# Patient Record
Sex: MALE | Race: BLACK OR AFRICAN AMERICAN
[De-identification: names, ages, dates, MRNs, and addresses within clinical notes are randomized per-mention and may not be internally consistent; named-entity substitution may affect disease eponyms.]

---

## 2023-02-06 ENCOUNTER — HOSPITAL ENCOUNTER (EMERGENCY)
Dept: HOSPITAL 97 - ER | Age: 38
Discharge: HOME | End: 2023-02-06
Payer: OTHER GOVERNMENT

## 2023-02-06 VITALS — SYSTOLIC BLOOD PRESSURE: 113 MMHG | OXYGEN SATURATION: 98 % | TEMPERATURE: 97.8 F | DIASTOLIC BLOOD PRESSURE: 90 MMHG

## 2023-02-06 DIAGNOSIS — J18.9: ICD-10-CM

## 2023-02-06 DIAGNOSIS — M62.838: Primary | ICD-10-CM

## 2023-02-06 LAB
ALBUMIN SERPL BCP-MCNC: 3.3 G/DL (ref 3.4–5)
ALP SERPL-CCNC: 111 U/L (ref 45–117)
ALT SERPL W P-5'-P-CCNC: 53 U/L (ref 16–61)
AST SERPL W P-5'-P-CCNC: 41 U/L (ref 15–37)
BUN BLD-MCNC: 15 MG/DL (ref 7–18)
GLUCOSE SERPLBLD-MCNC: 134 MG/DL (ref 74–106)
HCT VFR BLD CALC: 45.9 % (ref 39.6–49)
LIPASE SERPL-CCNC: 97 U/L (ref 73–393)
LYMPHOCYTES # SPEC AUTO: 1.4 K/UL (ref 0.7–4.9)
MCV RBC: 89.5 FL (ref 80–100)
PMV BLD: 9.7 FL (ref 7.6–11.3)
POTASSIUM SERPL-SCNC: 3.8 MMOL/L (ref 3.5–5.1)
RBC # BLD: 5.13 M/UL (ref 4.33–5.43)

## 2023-02-06 PROCEDURE — 83690 ASSAY OF LIPASE: CPT

## 2023-02-06 PROCEDURE — 83605 ASSAY OF LACTIC ACID: CPT

## 2023-02-06 PROCEDURE — 36415 COLL VENOUS BLD VENIPUNCTURE: CPT

## 2023-02-06 PROCEDURE — 85379 FIBRIN DEGRADATION QUANT: CPT

## 2023-02-06 PROCEDURE — 71045 X-RAY EXAM CHEST 1 VIEW: CPT

## 2023-02-06 PROCEDURE — 87040 BLOOD CULTURE FOR BACTERIA: CPT

## 2023-02-06 PROCEDURE — 85025 COMPLETE CBC W/AUTO DIFF WBC: CPT

## 2023-02-06 PROCEDURE — 80053 COMPREHEN METABOLIC PANEL: CPT

## 2023-02-06 NOTE — EDPHYS
Physician Documentation                                                                           

 [facility]                                                                 

Name: Dean Reyez Jr                                                                             

Age: 37 yrs                                                                                       

Sex: Male                                                                                         

: 1985                                                                                   

MRN: [id number]                                                                                   

Arrival Date: 2023                                                                          

Time: 15:41                                                                                       

Account#: [id number]                                                                            

Bed 15                                                                                            

Private MD:                                                                                       

ED Physician Edi Gibbs                                                                         

HPI:                                                                                              

                                                                                             

15:47 This 37 yrs old Black Male presents to ER via Unassigned with complaints of Abdominal   kb  

      Pain.                                                                                       

15:47 The patient has not experienced similar symptoms in the past. The patient has been      kb  

      recently seen by a physician:. Patient reports he recently had COVID-pneumonia and was      

      hospitalized at [facility], discharged on Saturday. States he has had a cough for        

      quite some time but today whenever he coughed he had cramping to his abdomen. Denies        

      any pain at rest. States the pain only comes with cough but not every time he coughs..      

                                                                                                  

Historical:                                                                                       

- Allergies:                                                                                      

18:36 No Known Allergies;                                                                     jh5 

                                                                                                  

- Immunization history:: Adult Immunizations up to date.                                          

- Social history:: Smoking status: Patient denies any tobacco usage or history of.                

                                                                                                  

                                                                                                  

ROS:                                                                                              

15:47 Constitutional: Negative for fever, chills, and weight loss.                            kb  

15:47 Abdomen/GI: Positive for abdominal pain, Negative for nausea, vomiting, and diarrhea.       

15:47 All other systems are negative.                                                             

                                                                                                  

Exam:                                                                                             

15:47 Constitutional:  This is a well developed, well nourished patient who is awake, alert,  kb  

      and in no acute distress. Head/Face:  Normocephalic, atraumatic. ENT:  Moist Mucous         

      membranes Cardiovascular:  Regular rate and rhythm with a normal S1 and S2.  No             

      gallops, murmurs, or rubs.  No pulse deficits. Respiratory:  Respirations even and          

      unlabored. No increased work of breathing. Talking in full sentences Abdomen/GI:  Soft,     

      non-tender. No distention.  Skin:  Warm, dry with normal turgor.  Normal color. MS/         

      Extremity:  Pulses equal, no cyanosis.  Neurovascular intact.  Full, normal range of        

      motion. Neuro:  Awake and alert, GCS 15, oriented to person, place, time, and               

      situation. Moves all extremities. Normal gait. Psych:  Awake, alert, with orientation       

      to person, place and time.  Behavior, mood, and affect are within normal limits.            

                                                                                                  

Vital Signs:                                                                                      

16:31  / 90; Pulse 78; Resp 26; Temp 97.8; Pulse Ox 98% on R/A; Weight 129.27 kg;       ph  

      Height 5 ft. 11 in. (180.34 cm);                                                            

16:31 Body Mass Index 39.75 (129.27 kg, 180.34 cm)                                            ph  

                                                                                                  

MDM:                                                                                              

15:45 Patient medically screened.                                                             kb  

15:47 Data reviewed: vital signs, nurses notes.                                               kb  

18:37 Differential diagnosis: non-specific abd pain, pneumonia, muscle spasm. Test considered kb  

      but Not performed: CT: CT abd/pelvis considered, but pt has no abd tenderness. Only         

      reports spasms. CT PE considered, but d-dimer wnl. Historians other than the Patient:       

      EMS: [facility]. Counseling: I had a detailed discussion with the patient and/or guardian       

      regarding: the historical points, exam findings, and any diagnostic results supporting      

      the discharge/admit diagnosis, lab results, radiology results, the need for outpatient      

      follow up, a family practitioner, to return to the emergency department if symptoms         

      worsen or persist or if there are any questions or concerns that arise at home.             

18:39 ED course: Pt was prescribed antibiotics upon discharge from [facility] on Saturday. Pt       kb  

      states he is taking them, but cannot remember the name of it. .                             

                                                                                                  

02                                                                                             

15:46 Order name: CBC with Diff; Complete Time: 17:48                                         kb  

                                                                                             

15:46 Order name: CMP; Complete Time: 17:48                                                   kb  

                                                                                             

15:46 Order name: Lipase; Complete Time: 17:48                                                kb  

                                                                                             

16:19 Order name: Blood Culture Adult (2)                                                     kb  

                                                                                             

16:19 Order name: Lactate w/ 2H reflex if indic.; Complete Time: 17:48                        kb  

                                                                                             

16:19 Order name: D-Dimer; Complete Time: 17:48                                               kb  

                                                                                             

15:46 Order name: IV Saline Lock; Complete Time: 17:18                                        kb  

                                                                                             

15:46 Order name: Labs collected and sent; Complete Time: 17:18                               kb  

                                                                                             

15:46 Order name: Chest Single View XRAY; Complete Time: 16:19                                kb  

                                                                                                  

Administered Medications:                                                                         

17:31 Drug: NS 0.9% 1000 ml Route: IV; Rate: 1 bolus; Site: right antecubital;                jh5 

17:31 Drug: TORadol - (ketorolac) 15 mg Route: IVP; Site: right antecubital;                  [facility] 

17:31 Drug: Zofran (Ondansetron) 4 mg Route: IVP; Site: right antecubital;                    [facility] 

                                                                                                  

                                                                                                  

Disposition Summary:                                                                              

23 18:38                                                                                    

Discharge Ordered                                                                                 

      Location: Home                                                                          kb  

      Condition: Stable                                                                       kb  

      Diagnosis                                                                                   

        - Muscle spasm                                                                        kb  

        - Pneumonia, unspecified organism                                                     kb  

      Followup:                                                                               kb  

        - With: Emergency Department                                                               

        - When: As needed                                                                          

        - Reason: Worsening of condition                                                           

      Followup:                                                                               kb  

        - With: Private Physician                                                                  

        - When: 2 - 3 days                                                                         

        - Reason: Recheck today's complaints, Continuance of care, Re-evaluation by your           

      physician                                                                                   

      Discharge Instructions:                                                                     

        - Discharge Summary Sheet                                                             kb  

        - Muscle Cramps and Spasms, Easy-to-Read                                              kb  

      Forms:                                                                                      

        - Medication Reconciliation Form                                                      kb  

        - Thank You Letter                                                                    kb  

        - Antibiotic Education                                                                kb  

        - Prescription Opioid Use                                                             kb  

      Prescriptions:                                                                              

        - orphenadrine citrate 100 mg Oral Tablet Sustained Release                                

            - take 1 tablet by ORAL route 2 times per day As needed; 20 tablet; Refills: 0,   kb  

      Product Selection Permitted                                                                 

Signatures:                                                                                       

Dispatcher [employer]                           Janine Quevedo FNP-C FNP-Ckb Rees, Jessica, RN                       RN   [facility]                                                  

                                                                                                  

**************************************************************************************************

## 2023-02-06 NOTE — XMS REPORT
Continuity of Care Document

                           Created on:2023



Patient:HEATHER METZ

Sex:Male

:1985

External Reference #:[id number]





Demographics







                          Address                   [address]



                                                    [place] [address]

 

                          Home Phone                [phone]

 

                          Work Phone                [phone]

 

                          Mobile Phone              [phone]

 

                          Email Address             NONE

 

                          Preferred Language        English

 

                          Marital Status            Unknown

 

                          Religion Affiliation     Unknown

 

                          Race                      Unknown

 

                          Additional Race(s)        Unavailable

 

                          Ethnic Group              Unknown









Author







                          Organization              [facility]

t

 

                          Address                   [address]



                                                    [place] [address]

 

                          Phone                     [phone]









Support







                Name            Relationship    Address         Phone

 

                BRETT METZ               Unavailable     [phone]









Care Team Providers







                    Name                Role                Phone

 

                    PCP, PATIENT DOES NOT HAVE A Primary Care Physician Ayse Soriano RN     Attending Clinician Unavailable

 

                    CAROLINA TORRES      Attending Clinician Unavailable

 

                    Brian RAZA, Carolina   Attending Clinician [phone]

 

                    CAROLINA TORRES      Admitting Clinician Unavailable

 

                    Carolina Torres MD   Admitting Clinician [phone]









Payers







           Payer Name Policy Type Policy Number Effective Date Expiration Date S

ource







Problems







       Condition Condition Condition Status Onset  Resolution Last   Treating Co

mments 

Source



       Name   Details Category        Date   Date   Treatment Clinician        



                                                 Date                 

 

       Obesity Obesity Disease Active                              [facility]



       (BMI   (BMI                 2-02                               ity of



       30-39.9) 30-39.9)               00:00:                             [place]



                                   00                                 Medical



                                                                      Branch

 

       Pneumonia Pneumonia Disease Active                              Uni

vers



       due to due to               2-02                               ity of



       Streptococ Streptococ               00:00:                             [place]



       cus    cus                                                   Medical



                                                                      Branch







Allergies, Adverse Reactions, Alerts







       Allergy Allergy Status Severity Reaction(s) Onset  Inactive Treating Comm

ents 

Source



       Name   Type                        Date   Date   Clinician        

 

       NO KNOWN Drug   Active                                           [facility]



       ALLERGIE Class                                                   ity of



       S                                                              [place]



                                                                      Medical



                                                                      Branch







Social History







           Social Habit Start Date Stop Date  Quantity   Comments   Source

 

           History SDOH Social                                             Unive

rsity of



           Connections Get                                             [place] [facility]

ical



           Together                                               Branch

 

           History SDOH Social                                             Unive

rsity of



           [facility]



                                                                  Branch

 

           History SDOH Social                                             Unive

rsity of



           [facility]



           Membership                                             Branch

 

           History SDOH Social                                             Unive

rsity of



           [facility]



           Meetings                                               Branch

 

           History of tobacco                       Passive smoker            Un

iversity of



           use                                                    [facility]



                                                                  Branch

 

           History SDOH 2023 0                     University o

f



           Alcohol Std Drinks 00:00:00   00:00:00                         [place] 

[facility]



                                                                  Branch

 

           History SDOH 2023 1                     University o

f



           Alcohol Binge 00:00:00   00:00:00                         [place] [facility]

al



                                                                  Branch

 

           History SDOH Social 2023 5                     Unive

rsity of



           Connections Phone 00:00:00   00:00:00                         [facility]

edical



                                                                  Branch

 

           History SDOH Social 2023 7                     Unive

rsity of



           Connections Living 00:00:00   00:00:00                         [place] 

[facility]



                                                                  Branch

 

           History SDOH 2023 5                     University o

f



           Physical Activity 00:00:00   00:00:00                         [facility]

edical



           DPW                                                    Branch

 

           History SDOH 2023 4                     University o

f



           Physical Activity 00:00:00   00:00:00                         [facility]

edical



           MPS                                                    Branch

 

           History SDOH 2023 5                     University o

f



           Financial  00:00:00   00:00:00                         [place] [facility]



                                                                  Branch

 

           History SDOH Food 2023 1                     Univers

ity of



           Worry      00:00:00   00:00:00                         [place] [facility]



                                                                  Branch

 

           History SDOH Food 2023 1                     Univers

ity of



           Scarcity   00:00:00   00:00:00                         [place] [facility]



                                                                  Branch

 

           History SDOH 2023 2                     University o

f



           Transport Med 00:00:00   00:00:00                         [place] [facility]

al



                                                                  Branch

 

           History SDOH 2023 2                     University o

f



           Transport Non-Med 00:00:00   00:00:00                         [facility]

edical



                                                                  Branch

 

           Alcohol intake 2023 Ex-drinker            University

 of



                      00:00:00   00:00:00   (finding)             [place] [facility]



                                                                  Branch

 

           History SDOH 2023 1                     University o

f



           Alcohol Frequency 00:00:00   00:00:00                         [facility]

edical



                                                                  Branch

 

           Exposure to 2023 Not sure              University of



           SARS-CoV-2 (event) 00:00:00   23:33:00                         [facility]

 

           Tobacco use and 2023 Smokeless             Universit

y of



           exposure   00:00:00   00:00:00   tobacco non-user            [facility]

 

           Sex Assigned At 1985                       Universit

y of



           Birth      00:00:00   00:00:00                         [facility]









                Smoking Status  Start Date      Stop Date       Source

 

                Ex-smoker       2023 00:00:00 2023 00:00:00 [facility]







Medications







       Ordered Filled Start  Stop   Current Ordering Indication Dosage Frequency

 Signature

                    Comments            Components          Source



     Medication Medication Date Date Medication? Clinician                (SIG) 

          



     Name Name                                                   

 

     amLODIPine      2023- Yes       [id number] 10mg      Take 1           

Univers



     10 mg      -08                          tablet by           ity of



     tablet      00:00: 05:59                          mouth in           [place]



               00   :00                           [facility]



                                                  for 30           



                                                  days.           

 

     losartan 50      2023- Yes       [id number] 50mg      Take 1          

 Univers



     mg tablet      -08                          tablet by           ity 

of



               00:00: 05:59                          mouth in           [place]



               00   :00                           [facility]



                                                  for 30           



                                                  days.           

 

     amLODIPine      2023- Yes       [id number] 10mg      Take 1           

Univers



     10 mg      -08                          tablet by           ity of



     tablet      00:00: 05:59                          mouth in           [place]



               00   :00                           [facility]



                                                  for 30           



                                                  days.           

 

     losartan 50      2023- Yes       [id number] 50mg      Take 1          

 Univers



     mg tablet      -08                          tablet by           ity 

of



               00:00: 05:59                          mouth in           [place]



               00   :00                           [facility]



                                                  for 30           



                                                  days.           

 

     amLODIPine      2023- Yes       [id number] 10mg      Take 1           

Univers



     10 mg      -08                          tablet by           ity of



     tablet      00:00: 05:59                          mouth in           [place]



               00   :00                           [facility]



                                                  for 30           



                                                  days.           

 

     losartan 50      2023- Yes       [id number] 50mg      Take 1          

 Univers



     mg tablet      -08                          tablet by           ity 

of



               00:00: 05:59                          mouth in           [place]



               00   :00                           [facility]



                                                  for 30           



                                                  days.           

 

     remdesivir      2023- Yes            100mg      100 mg, IV          

 Univers



     100 mg in      08                          Infusion,           ity 

of



     NaCl 0.9%      22:00: 21:59                          DAILY AT           [facility]

as



     (NS) 100 mL      00   :00                           1600, 4           Medic

al



     MINI-BAG                                         doses,           Branch



                                                  First dose           



                                                  on Sat           



                                                  23 at           



                                                  1600, Last           



                                                  dose on           



                                                  23           



                                                  at 1600,           



                                                  Administer           



                                                  over 60           



                                                  Minutes,           



                                                  100            



                                                  mL<br>Is           



                                                  the            



                                                  patient           



                                                  mechanical           



                                                  ly             



                                                  ventilated           



                                                  ? NO           

 

     amLODIPine            Yes            10mg      10 mg,           Unive

rs



     (NORVASC)      2-04                               Oral,           ity of



     tablet 10      15:00:                               DAILY,           [place]



     mg        00                                 First dose           Medical



                                                  (after           Branch



                                                  last           



                                                  modificati           



                                                  on) on Sat           



                                                  23 at           



                                                  0900,           



                                                  Until           



                                                  Discontinu           



                                                  ed,            



                                                  Routine           

 

     losartan            Yes            50mg      50 mg,           Univers



     (COZAAR)      2-04                               Oral,           ity of



     tablet 50      14:30:                               DAILY,           [place]



     mg        00                                 First dose           Medical



                                                  on [facility]



                                                  23 at           



                                                  0830,           



                                                  Until           



                                                  Discontinu           



                                                  ed,            



                                                  Routine           

 

     acetaminoph            Yes            650mg      650 mg,           Un

fitz



     en        2-04                               Oral,           ity of



     (TYLENOL)      14:28:                               Q6HPRN,           [place]



     tablet 650      54                                 Starting           Medic

al



     mg                                           on [facility]



                                                  23 at           



                                                  0828,           



                                                  Until           



                                                  Discontinu           



                                                  ed,            



                                                  Routine,           



                                                  Pain           



                                                  (scale           



                                                  1-3)           

 

     ALBUTEROL            Yes                      Inhale as           Uni

vers



     INHALE      2-04                               needed.           ity of



               14:20:                                              [address]

 

     ALBUTEROL            Yes                      Inhale as           Uni

vers



     INHALE      2-04                               needed.           ity of



               14:20:                                              [address]

 

     ALBUTEROL      0      Yes                      Inhale as           Uni

vers



     INHALE      2-04                               needed.           ity of



               14:20:                                              [address]

 

     lactobacill      2023- Yes       [id number] .5mg      Take 1          

 Univers



     us        2- 02-15                          tablet by           ity of



     acidophilus      00:00: 05:59                          mouth in           [place]

[place]



               00   :00                           the            Medical



                                                  morning           Branch



                                                  and 1           



                                                  tablet in           



                                                  the            



                                                  evening.           



                                                  Do all           



                                                  this for           



                                                  10 days.           

 

     lactobacill      0 - Yes       [id number] .5mg      Take 1          

 Univers



     us        2- 02-15                          tablet by           ity of



     acidophilus      00:00: 05:59                          mouth in           [place]

[place]



               00   :00                           the            Medical



                                                  morning           Branch



                                                  and 1           



                                                  tablet in           



                                                  the            



                                                  evening.           



                                                  Do all           



                                                  this for           



                                                  10 days.           

 

     lactobacill      2023- Yes       [id number] .5mg      Take 1          

 Univers



     us        2- 02-15                          tablet by           ity of



     acidophilus      00:00: 05:59                          mouth in           [place]

[place]



               00   :00                           the            Medical



                                                  morning           Branch



                                                  and 1           



                                                  tablet in           



                                                  the            



                                                  evening.           



                                                  Do all           



                                                  this for           



                                                  10 days.           

 

     levoFLOXaci      3-0 2023- Yes       [id number] 750mg      Take 1         

  Univers



     n         2- 02-10                          tablet by           ity of



     (LEVAQUIN)      00:00: 05:59                          mouth           [place]



     750 mg      00   :00                           every 24           Medical



     tablet                                         [facility]           [facility]



                                                  ur) hours           



                                                  for 5           



                                                  days.           

 

     levoFLOXaci      2023-0 2023- Yes       [id number] 750mg      Take 1         

  Univers



     n         2- 02-10                          tablet by           ity of



     (LEVAQUIN)      00:00: 05:59                          mouth           [place]



     750 mg      00   :00                           every 24           Medical



     tablet                                         [facility]           Branch



                                                  ur) hours           



                                                  for 5           



                                                  days.           

 

     levoFLOXaci      -0 - Yes       [id number] 750mg      Take 1         

  Univers



     n         2-10                          tablet by           ity of



     (LEVAQUIN)      00:00: 05:59                          mouth           [place]



     750 mg      00   :00                           every 24           Medical



     tablet                                         ([facility]           Branch



                                                  ur) hours           



                                                  for 5           



                                                  days.           

 

     amLODIPine      -0 - No             5mg       5 mg,           Unive

rs



     (NORVASC)      -                          Oral,           ity of



     tablet 5 mg      23:00: 22:38                          ONCE, 1           [facility]

[place]



               00   :00                           dose, On           Medical



                                                  Fri 2/3/23           Branch



                                                  at 1700,           



                                                  Routine           

 

     hydralAZINE            Yes            10mg      10 mg,           Univ

ers



     (APRESOLINE      2-03                               Slow IV           ity o

f



     ) injection      22:06:                               Push,           [place]



     10 mg      32                                 Q6HPRN,           Medical



                                                  Starting           Branch



                                                  on Fri           



                                                  2/3/23 at           



                                                  1606,           



                                                  Until           



                                                  Discontinu           



                                                  ed, STAT,           



                                                  DBP=>100;           



                                                  SBP=>160           

 

     amLODIPine      -0 - No             5mg       5 mg,           Unive

rs



     (NORVASC)      -03                          Oral,           ity of



     tablet 5 mg      16:45: 22:05                          DAILY,           [facility]

as



               00   :41                           First dose           Medical



                                                  on Fri           Branch



                                                  2/3/23 at           



                                                  1045,           



                                                  Until           



                                                  Discontinu           



                                                  ed,            



                                                  Routine           

 

     codeine-gua      -0      Yes            10mL      10 mL,           Univ

ers



     ifenesin      2-03                               Oral,           ity of



     (ROBITUSSIN      16:38:                               Q6HPRN,           Matt

as



     AC)       03                                 Starting           Medic

al



     mg/5 mL                                         on Fri           Branch



     oral                                         2/3/23 at           



     solution 10                                         1038,           



     mL                                           Until           



                                                  Discontinu           



                                                  ed,            



                                                  Routine,           



                                                  Cough           

 

     piperacilli      2023-0 2023- Yes            3.375g      3.375 g,          

 Univers



     n-tazobacta      -13                          IV             ity of



     m (ZOSYN)      16:00: 15:59                          Piggyback,           T

exas



     3.375 g in      00   :00                           Q8H ABX,           Medic

al



     NaCl 0.9%                                         30 doses,           Branc

h



     (NS) 50 mL                                         First dose           



     MINI-BAG                                         on Fri           



                                                  2/3/23 at           



                                                  1000, Last           



                                                  dose on           



                                                  23 at           



                                                  0200,           



                                                  Administer           



                                                  over 4           



                                                  Hours, 50           



                                                  mL<br>Reas           



                                                  on for           



                                                  Anti-Infec           



                                                  tive:           



                                                  Empiric           



                                                  Therapy           



                                                  for            



                                                  Suspected           



                                                  Infection<           



                                                  br>Empiric           



                                                  Therapy           



                                                  Site:           



                                                  Respirator           



                                                  y<br>Durat           



                                                  ion of           



                                                  therapy: 5           



                                                  days           

 

     lactobacill      -0      Yes            .5mg      0.5 mg,           Uni

vers



     us        2-03                               Oral, BID,           ity of



     acidophilus      14:00:                               First dose           

[place]



     tablet 0.5      00                                 on Fri           Medical



     mg                                           2/3/23 at           Branch



                                                  0800,           



                                                  Until           



                                                  Discontinu           



                                                  ed,            



                                                  Routine           

 

     docusate      -0      Yes            100mg      100 mg,           Unive

rs



     (COLACE)      2-03                               Oral, BID,           ity o

f



     capsule 100      14:00:                               First dose           

[place]



     mg        00                                 on Fri           Medical



                                                  2/3/23 at           Branch



                                                  0800,           



                                                  Until           



                                                  Discontinu           



                                                  ed,            



                                                  Routine           

 

     piperacilli      2023-0 2023- No             4.5g      4.5 g, IV           

[facility]



     n-tazobacta      -                          Piggyback,           i

ty of



     m (ZOSYN)      08:15: 10:37                          ONCE, 1           [place]

s



     4.5 g in      00   :15                           dose, On           Medical



     NaCl 0.9%                                         Fri 2/3/23           Bran

ch



     (NS) 50 mL                                         at 0215,           



     MINI-BAG                                         Administer           



                                                  over 30           



                                                  Minutes,           



                                                  50             



                                                  mL<br>Reas           



                                                  on for           



                                                  Anti-Infec           



                                                  tive:           



                                                  Empiric           



                                                  Therapy           



                                                  for            



                                                  Suspected           



                                                  Infection<           



                                                  br>Empiric           



                                                  Therapy           



                                                  Site:           



                                                  Respirator           



                                                  y<br>Durat           



                                                  ion of           



                                                  therapy: 5           



                                                  days           

 

     ipratropium      3-0      Yes            3mL       3 mL,           Unive

rs



     -albuteroL      2-03                               Inhalation           ity

 of



     (DUONEB)      07:30:                               , QID,           [place]



     0.5 mg-3      00                                 First dose           Medic

al



     mg(2.5 mg                                         on Fri           Branch



     base)/3 mL                                         2/3/23 at           



     nebulizer                                         0130,           



     solution 3                                         Until           



     mL                                           Discontinu           



                                                  ed,            



                                                  Routine           

 

     NaCl 0.9%      2023- No             1000mL      at 100           Uni

vers



     (NS) IV      203 02-03                          mL/hr, IV           ity of



     infusion      07:30: 17:55                          Infusion,           Matt

as



     1,000 mL      00   :18                           CONTINUOUS           Medic

al



                                                  , Starting           Branch



                                                  on Fri           



                                                  2/3/23 at           



                                                  0130,           



                                                  Until Fri           



                                                  2/3/23 at           



                                                  1155,           



                                                  Routine           

 

     ondansetron            Yes            4mg       4 mg, Slow           

Univers



     (ZOFRAN                                     IV Push,           ity of



     (PF))      05:58:                               Q6HPRN,           [place]



     injection 4      00                                 Starting           Medi

prateek



     mg                                           on Thu           Branch



                                                  23 at           



                                                  2358,           



                                                  Until           



                                                  Discontinu           



                                                  ed,            



                                                  Routine,           



                                                  Nausea and           



                                                  Vomiting           



                                                  (N/V)           

 

     traMADOL      2023- No        [id number] 100mg      Take 2           Un

fitz



     (ULTRAM) 50                                tablets by           i

ty of



     mg tablet      00:00: 00:00                          mouth           [place]



               00   :00                           every 6           Medical



                                                  (six)           Branch



                                                  hours as           



                                                  needed           



                                                  (severe           



                                                  pain,           



                                                  alternate           



                                                  with           



                                                  ibuprofen)           



                                                  .              

 

     ibuprofen      2023- No        [id number] 800mg      Take 1           U

nivers



     800 mg                                tablet by           ity of



     tablet      00:00: 00:00                          mouth           [place]



               00   :00                           every 8           Medical



                                                  (eight)           Branch



                                                  hours as           



                                                  needed for           



                                                  Pain           



                                                  (scale           



                                                  1-3) or           



                                                  Pain           



                                                  (scale           



                                                  4-6).           

 

     ondansetron      2023- No        [id number] 8mg       Take 1           

Univers



     (ZOFRAN                                tablet by           ity of



     ODT) 8 mg      00:00: 00:00                          mouth           [place]



     disintegrat      00   :00                           every 8           Medic

al



     ing tablet                                         (eight)           Branch



                                                  hours as           



                                                  needed for           



                                                  Nausea and           



                                                  Vomiting           



                                                  (N/V).           







Immunizations







           Ordered    Filled Immunization Date       Status     Comments   [facility]

e



           Immunization Name Name                                        

 

           Remdesivir            2023 Completed             [facility]



                                 00:00:00                         [facility]

 

           Remdesivir            2023 Completed             [facility]



                                 00:00:00                         [facility]

 

           Remdesivir            2023 Completed             [facility]



                                 00:00:00                         [facility]







Vital Signs







             Vital Name   Observation Time Observation Value Comments     Source

 

             Heart rate   2023 18:00:00 88 /min                   [facility]

 

             Respiratory rate 2023 18:00:00 17 /min                   [facility]

 

             Oxygen saturation in 2023 18:00:00 95 /min                   

[facility]



             Arterial blood by                                        [place] [facility]

[facility]ate[facility]



             Pulse oximetry                                        Branch

 

             Systolic blood 2023 17:46:00 149 mm[Hg]                Univer

sity of



             pressure                                            [facility]

 

             Diastolic blood 2023 17:46:00 90 mm[Hg]                 Unive

[facility]

 

             Body temperature 2023 17:46:00 36.78 Jolie                 [facility]

 

             Body weight  2023 09:43:00 128.459 kg                [facility]

 

             BMI          2023 09:43:00 39.50 kg/m2               [facility]

 

             Body height  2023 05:37:00 180.3 cm                  [facility]







Procedures







                Procedure       Date / Time     Performing Clinician Source



                                Performed                       

 

                XR CHEST 1 VW   2023 20:51:00 Karolina Unger [facility]

 

                SODIUM, URINE RANDOM 2023 20:36:00 Carolina Torres  [facility]

 

                PROTEIN CREAT RATIO 2023 20:36:00 Carolina Torres  [facility]



                URINE RANDOM                                    [facility]

 

                URINE DRUG (IMMUNOASSAY) 2023 20:36:00 Carolina Torres  [facility]



                SCREEN                                          

 

                URINE CULTURE   2023 20:35:00 Carolina Torres  [facility]

 

                URINALYSIS      2023 20:35:00 Carolina Torres  [facility]

 

                SPUTUM CULTURE  2023 16:26:00 Carolina Torres  [facility]

 

                FREE T3         2023 16:23:00 Carolina Torres  [facility]

 

                BLOOD CULTURE SCREEN 2023 16:23:00 Carolina Torres  [facility]

 

                FREE T4         2023 16:23:00 Brian [facility]

 

                PNEUMOCOCCAL ANTIGEN 2023 09:09:00 Brian handy  [facility]

 

                N-TERMINAL PRO-BNP 2023 08:58:00 Brian [facility]

 

                PROCALCITONIN   2023 08:58:00 Brian [facility]

 

                AC VBG + LACTIC ACID 2023 08:58:00 Brian handy  [facility]

 

                PHOSPHORUS      2023 08:58:00 Brian [facility]

 

                LACTATE DEHYDROGENASE 2023 08:58:00 [facility]

 

                CREATINE KINASE 2023 08:58:00 Brian [facility]

 

                MAGNESIUM       2023 08:58:00 Brian [facility]

 

                FERRITIN SERUM  2023 08:58:00 Brian [facility]

 

                C-REACTIVE PROTEIN 2023 08:58:00 Brian [facility]

 

                TROPONIN I      2023 08:58:00 Brian [facility]

 

                THYROID STIMULATING 2023 08:58:00 Brian handy  [facility]



                HORMONE                                         [facility]

 

                COMP. METABOLIC PANEL 2023 08:58:00 Brian [facility]



                ([id number])                                         [facility]

 

                LIPID PANEL     2023 08:58:00 Brian [facility]



                ([id number])(TOTAL                                   [facility]



                CHOLESTEROL,                                    



                TRIGLYCERIDES, HDL)                                 

 

                SEDIMENTATION RATE 2023 08:58:00 Brian [facility]

 

                CBC WITH DIFF   2023 08:58:00 Brian [facility]

 

                GLYCOSYLATED HEMOGLOBIN 2023 08:58:00 Brian Adnan  [facility]

[facility] [place]



                (A1C)                                           [facility]







Encounters







        Start   End     Encounter Admission Attending Care    Care    Encounter 

Source



        Date/Time Date/Time Type    Type    Clinicians Facility Department ID   

   

 

        2023 Transition         GRACE Murphy  [id number]

[id number] [facility]



        00:00:00 00:00:00 of Care         Ayse EPPERSON   [id number]         it

y of



                                                FRANCA   [id number]         [place]

[place]



                                                        [phone]         Medi

prateek



                                                        403             Branch

 

        2023 Inpatient U       BRIAN [facility]     [id number]

[id number] Univers



        22:57:00 13:50:00                 ADNAN                           ity [facility]

 

        2023 [facility]         [facility]    [id number]

[id number] Univers



        22:57:00 13:50:00 Encounter         Carolina COOPER [id number]        

 ity of



                                                [facility] [id number]         [facility]  [phone]         Medi

prateek



                                                        081             Branch







Results







           Test Description Test Time  Test Comments Results    Result Comments 

Source









                    SPUTUM CULTURE      2023 06:52:32 









                      Test Item  Value      Reference Range Interpretation Comme

nts









                SPUTUM CULTURE (test code = 622-1) Specimen cellular elements do

 not represent                 

                                        



                          lower respiratory tract. Specimen rejected            

               



                          for routine bacterial culture. Suggest                

           



                          reorder and recollection.                           

 

             Gram stain (test code = 664-3) Numerous Epithelial cells present   

                        



[id number] 18:59:20





             Test Item    Value        Reference Range Interpretation Comments

 

             CRP (test code = 4463621206) 7.9 mg/dL    <=0.8        H           

 

 

             Lab Interpretation (test code = Abnormal                           

    



             48338-1)                                            



[facility]56[facility]26





             Test Item    Value        Reference Range Interpretation Comments

 

             Procalcitonin (test 0.39 ng/mL   <=0.07       H            



             code = 2958866024)                                        

 

             COOKIE (test code = COOKIE) INTERPRETATION OF                           



                          PROCALCITONIN RESULTS IN                           



                          ADULTS >= 18 YEARS OF                           



                          AGE Initiation and                           



                          discontinuation of                           



                          antibiotics on patients                           



                          with suspected or                           



                          confirmed Lower                           



                          Respiratory Tract                           



                          Infection in Adults >=                           



                          18 years of age.                           



                          +--------------+--------                           



                          --------+---------------                           



                          +-----------------------                           



                          -----+|Procalcitonin                           



                          |Interpretation                           



                          ?|Antibiotic ? ?                           



                          |Considerations ? ? ? ?                           



                          ? ? ? |ng/mL ? ? ? ? | ?                           



                          ? ? ? ? ? ?                            



                          ?|recommendation | ? ? ?                           



                          ? ? ? ? ? ? ? ? ? ? ?                           



                          +--------------+--------                           



                          --------+---------------                           



                          +-----------------------                           



                          -----+| <0.1 ? ? ? ? |                           



                          Bacterial ? ? ?|                           



                          Strongly ? ? ?| ? ? ? ?                           



                          ? ? ? ? ? ? ? ? ? ? | ?                           



                          ? ? ? ? ? ?| infection                           



                          very | discouraged ? |                           



                          Overruling: ? ? ? ? ? ?                           



                          ? ? | ? ? ? ? ? ? ?|                           



                          unlikely ? ? ? | ? ? ? ?                           



                          ? ? ? | ? Clinically                           



                          unstable ? ? ?                           



                          +--------------+--------                           



                          --------+---------------                           



                          + ? High risk for                           



                          adverse ? ? | <0.25 ? ?                           



                          ? ?| Bacterial ? ? ?|                           



                          Discouraged ? | ?                           



                          outcome ? ? ? ? ? ? ? ?                           



                          ? | ? ? ? ? ? ? ?|                           



                          infection ? ? ?| ? ? ? ?                           



                          ? ? ? | ? SEE IMPORTANT                           



                          NOTE ? ? ? ?| ? ? ? ? ?                           



                          ? ?| unlikely ? ? ? | ?                           



                          ? ? ? ? ? ? | ? ? ? ? ?                           



                          ? ? ? ? ? ? ? ? ?                           



                          +--------------+--------                           



                          --------+---------------                           



                          +-----------------------                           



                          -----+| >=0.25 ? ? ? |                           



                          Bacterial ? ? ?|                           



                          Encouraged ? ?| ? ? ? ?                           



                          ? ? ? ? ? ? ? ? ? ? | ?                           



                          ? ? ? ? ? ?| infection ?                           



                          ? ?| ? ? ? ? ? ? ? | ? ?                           



                          ? ? ? ? ? ? ? ? ? ? ? ?                           



                          | ? ? ? ? ? ? ?| likely                           



                          ? ? ? ? | ? ? ? ? ? ? ?                           



                          | Consider treatment                           



                          failure                                



                          ?+--------------+-------                           



                          ---------+--------------                           



                          -+ if levels does not                           



                          decrease | >0.5 ? ? ? ?                           



                          | Bacterial ? ? ?|                           



                          Strongly ? ? ?|                           



                          appropriately ? ? ? ? ?                           



                          ? ? | ? ? ? ? ? ? ?|                           



                          infection very |                           



                          encouraged ? ?| ? ? ? ?                           



                          ? ? ? ? ? ? ? ? ? ? | ?                           



                          ? ? ? ? ? ?| likely ? ?                           



                          ? ? | ? ? ? ? ? ? ? | ?                           



                          ? ? ? ? ? ? ? ? ? ? ? ?                           



                          ?                                      



                          +--------------+--------                           



                          --------+---------------                           



                          +-----------------------                           



                          -----+ Discontinuation                           



                          of antibiotics in                           



                          high-acuity patients                           



                          with suspected or                           



                          confirmed sepsis in                           



                          Adults >= 18 years of                           



                          age.                                   



                          +--------------+--------                           



                          --------+---------------                           



                          +-----------------------                           



                          -----+|Procalcitonin                           



                          |Interpretation                           



                          ?|Antibiotic ? ?                           



                          |Considerations ? ? ? ?                           



                          ? ? ? |ng/mL ? ? ? ? | ?                           



                          ? ? ? ? ? ?                            



                          ?|recommendation | ? ? ?                           



                          ? ? ? ? ? ? ? ? ? ? ?                           



                          +--------------+--------                           



                          --------+---------------                           



                          +-----------------------                           



                          -----+| <0.25 ? ? ? ?|                           



                          Bacterial ? ? ?|                           



                          Strongly ? ? ?| ? ? ? ?                           



                          ? ? ? ? ? ? ? ? ? ? | ?                           



                          ? ? ? ? ? ?| infection                           



                          very | discouraged ? |                           



                          Overruling: ? ? ? ? ? ?                           



                          ? ? | ? ? ? ? ? ? ?|                           



                          unlikely ? ? ? | ? ? ? ?                           



                          ? ? ? | ? Clinically                           



                          unstable ? ? ?                           



                          +--------------+--------                           



                          --------+---------------                           



                          + ? High risk for                           



                          adverse ? ? | <0.5 or                           



                          drop | Bacterial ? ? ?|                           



                          Discouraged ? | ?                           



                          outcome ? ? ? ? ? ? ? ?                           



                          ? | >80% from ? ?|                           



                          infection ? ? ?| ? ? ? ?                           



                          ? ? ? | ? SEE IMPORTANT                           



                          NOTE ? ? ? ?| highest                           



                          PCT ?| unlikely ? ? ? |                           



                          ? ? ? ? ? ? ? | ? ? ? ?                           



                          ? ? ? ? ? ? ? ? ? ? |                           



                          level ? ? ? ?| ? ? ? ? ?                           



                          ? ? ?| ? ? ? ? ? ? ? | ?                           



                          ? ? ? ? ? ? ? ? ? ? ? ?                           



                          ?                                      



                          +--------------+--------                           



                          --------+---------------                           



                          +-----------------------                           



                          -----+| >=0.5 ? ? ? ?|                           



                          Bacterial ? ? ?|                           



                          Encouraged ? ?| ? ? ? ?                           



                          ? ? ? ? ? ? ? ? ? ? | ?                           



                          ? ? ? ? ? ?| infection ?                           



                          ? ?| ? ? ? ? ? ? ? | ? ?                           



                          ? ? ? ? ? ? ? ? ? ? ? ?                           



                          | ? ? ? ? ? ? ?| likely                           



                          ? ? ? ? | ? ? ? ? ? ? ?                           



                          | Consider treatment                           



                          failure                                



                          ?+--------------+-------                           



                          ---------+--------------                           



                          -+ if levels does not                           



                          decrease | >1.0 ? ? ? ?                           



                          | Bacterial ? ? ?|                           



                          Strongly ? ? ?|                           



                          appropriately ? ? ? ? ?                           



                          ? ? | ? ? ? ? ? ? ?|                           



                          infection very |                           



                          encouraged ? ?| ? ? ? ?                           



                          ? ? ? ? ? ? ? ? ? ? | ?                           



                          ? ? ? ? ? ?| likely ? ?                           



                          ? ? | ? ? ? ? ? ? ? | ?                           



                          ? ? ? ? ? ? ? ? ? ? ? ?                           



                          ?                                      



                          +--------------+--------                           



                          --------+---------------                           



                          +-----------------------                           



                          -----+ Percentage of                           



                          drop of Procalcitonin                           



                          calculation for                           



                          Discontinuation of                           



                          antibiotics in                           



                          high-acuity patients                           



                          with suspected or                           



                          confirmed sepsis in                           



                          Adults >= 18 years of                           



                          age. ? ? ? ? ? ? ? ? ? ?                           



                          ? Procalcitonin                           



                          highest{}-Procalcitonin                           



                          current{}Delta                           



                          Procalcitonin =                           



                          ________________________                           



                          _______________________                           



                          x100% ? ? ? ? ? ? ? ? ?                           



                          ? ? ? ? ? ? ?                           



                          Procalcitonin current {}                           



                          IMPORTANT NOTE:                           



                          Procalcitonin may be                           



                          elevated without                           



                          bacterial infection by                           



                          physiologic stress                           



                          related to trauma,                           



                          burns, chronic dialysis,                           



                          metastatic cancer,                           



                          surgery in the past                           



                          seven days, malaria,                           



                          some fungal infections,                           



                          and some forms of                           



                          vasculitis. The                           



                          interpretation algorithm                           



                          may not apply to                           



                          patients with                           



                          immunosuppression                           



                          (equivalent of >10 mg of                           



                          prednisone daily), HIV                           



                          with CD4 cell count <                           



                          350 cells/mm3, active                           



                          malignancy on systemic                           



                          chemotherapy, solid                           



                          organ transplant or                           



                          hematopoietic stem cell                           



                          transplantation, or                           



                          hospital acquired                           



                          pneumonia. Additionally,                           



                          some clinical trials of                           



                          procalcitonin have                           



                          excluded patients with                           



                          shock requiring                           



                          vasopressor use, acute                           



                          respiratory failure                           



                          requiring mechanical                           



                          ventilation, or those                           



                          with known lung                           



                          abscess/empyema. For                           



                          further information                           



                          please refer                           



                          to:[email or web address]                           



                          [email or web address]                           

 

             Lab Interpretation Abnormal                               



             (test code = 08395-5)                                        



[facility] [id number] 17:32:21





             Test Item    Value        Reference Range Interpretation Comments

 

             FREE T4 (test code = 1.12         See_Comment                [Autom

ated message]



             [id number])                                         The system [employer]



                                                                 generated this 

result



                                                                 transmitted ref

erence



                                                                 range: 0.78 - 2

.20



                                                                 ng/dL:. The ref

erence



                                                                 range was not u

sed to



                                                                 interpret this 

result



                                                                 as normal/abnor

mal.

 

             Lab Interpretation (test Normal                                 



             code = 43017-3)                                        



[facility] [id number] 17:31:40





             Test Item    Value        Reference Range Interpretation Comments

 

             FREE T3 (test code = 3879409366) 3.05 pg/mL   2.77-5.27            

     

 

             Lab Interpretation (test code = Normal                             

    



             08784-1)                                            



[id number] CHOLESTEROL, 
TRIGLYCERIDES, HDL)2023 12:26:36





             Test Item    Value        Reference Range Interpretation Comments

 

             CHOL (test code = 9339719372) 131 mg/dL    120-200                 

  

 

             HDL (test code = 4107493446) 40 mg/dL     >=40         L           

 

 

             HDLC RATIO (test code = 0054747888) 3.3          <=5.0             

        

 

             TRIG (test code = 1576588810) 90 mg/dL                       

  

 

             LDL CHOL (test code = 12508-6) 73 mg/dL     <=160                  

   

 

             VLDL (test code = 6012295480) 18 mg/dL     5-60                    

  

 

             Lab Interpretation (test code = Abnormal                           

    



             52460-2)                                            



[id number]58[facility]14





             Test Item    Value        Reference Range Interpretation Comments

 

             FERRITIN (test code = 326.0 ng/mL  18.0-464.0                



             [id number])                                         

 

             COOKIE (test code = COOKIE) Biotin has been                           



                          reported to cause a                           



                          negative bias,                           



                          interpret results                           



                          relative to                            



                          patient's use of                           



                          biotin.                                

 

             Lab Interpretation (test Normal                                 



             code = 79800-9)                                        



[id number] 11:54:12





             Test Item    Value        Reference Range Interpretation Comments

 

             TSH (test code = 0.30         See_Comment  L             [Automated

 message]



             [id number])                                         The system [employer]



                                                                 generated this 

result



                                                                 transmitted ref

erence



                                                                 range: 0.45 - 4

.70



                                                                 mIU/L. The refe

rence



                                                                 range was not u

sed to



                                                                 interpret this 

result



                                                                 as normal/abnor

mal.

 

             Lab Interpretation (test Abnormal                               



             code = 75323-1)                                        



[id number]





             Test Item    Value        Reference Range Interpretation Comments

 

             TROPONIN I (test code = 0.005 ng/mL  <=0.034                   



             [id number])                                         

 

             COOKIE (test code = COOKIE) Reference (Normal)                           



                          Range (defined by the                           



                          99th percentile                           



                          reference limit): <=                           



                          0.034 ng/mL Note:                           



                          Cardiac troponin                           



                          begins to rise 3-4                           



                          hours after the onset                           



                          of ischemia. Repeat in                           



                          4-6 hours if the                           



                          sample was drawn                           



                          within 3-4 hours of                           



                          the onset of the                           



                          symptom and found                           



                          normal. Diagnosis of                           



                          myocardial injury is                           



                          made with acute                           



                          changes in cTn                           



                          concentrations with at                           



                          least one serial                           



                          sample above the 99th                           



                          percentile upper                           



                          reference limit (URL),                           



                          taken together with                           



                          the patient's clinical                           



                          presentation. Biotin                           



                          has been reported to                           



                          cause a negative bias,                           



                          interpret results                           



                          relative to patient's                           



                          use of biotin.                           

 

             Lab Interpretation Normal                                 



             (test code = 79430-5)                                        



[id number] 11:32:32





             Test Item    Value        Reference Range Interpretation Comments

 

             NT-proBNP (test code = 18 pg/mL     <=125                     



             [id number])                                         

 

             COOKIE (test code = COOKIE) Biotin has been                           



                          reported to cause a                           



                          negative bias,                           



                          interpret results                           



                          relative to                            



                          patient's use of                           



                          biotin.                                

 

             Lab Interpretation (test Normal                                 



             code = 40145-0)                                        



[id number][facility] 
11:23:52





             Test Item    Value        Reference Range Interpretation Comments

 

             NA (test code = 138 mmol/L   135-145                   



             [id number])                                         

 

             K (test code = 4.3 mmol/L   3.5-5.0                   



             6164342510)                                         

 

             CL (test code = 101 mmol/L                       



             6210251938)                                         

 

             CO2 TOTAL (test code = 31 mmol/L    23-31                     



             9796216394)                                         

 

             AGAP (test code = 6            2-16                      



             6524830885)                                         

 

             BUN (test code = 14 mg/dL     7-23                      



             9694015044)                                         

 

             GLUCOSE (test code = 149 mg/dL           H            



             8194335125)                                         

 

             CREATININE (test code = 1.06 mg/dL   0.60-1.25                 



             7003503736)                                         

 

             TOTAL BILI (test code = 0.5 mg/dL    0.1-1.1                   



             9695408124)                                         

 

             CALCIUM (test code = 8.3 mg/dL    8.6-10.6     L            



             7109994997)                                         

 

             T PROTEIN (test code = 8.3 g/dL     6.3-8.2      H            



             9799177120)                                         

 

             ALBUMIN (test code = 4.1 g/dL     3.5-5.0                   



             1385790780)                                         

 

             ALK PHOS (test code = 79 U/L                           



             8090597261)                                         

 

             ALTv (test code = 50 U/L       5-50                      



             1742-6)                                             

 

             AST(SGOT) (test code = 49 U/L       13-40        H            



             [id number])                                         

 

             eGFR (test code = 78.6         mL/min/1.73m2              



             [id number])                                         

 

             COOKIE (test code = COOKIE) Association of                           



                          Glomerular Filtration                           



                          Rate (GFR) and Staging                           



                          of Kidney Disease*                           



                          +---------------------                           



                          --+-------------------                           



                          --+-------------------                           



                          ------+| GFR                           



                          (mL/min/1.73 m2) ?|                           



                          With Kidney Damage ?|                           



                          ?Without Kidney                           



                          Damage+---------------                           



                          --------+-------------                           



                          --------+-------------                           



                          ------------+| ?>90 ?                           



                          ? ? ? ? ? ? ? ?|                           



                          ?Stage one ? ? ? ? ?|                           



                          ? Normal ? ? ? ? ? ? ?                           



                          ?+--------------------                           



                          ---+------------------                           



                          ---+------------------                           



                          -------+| ?60-89 ? ? ?                           



                          ? ? ? ? ?| ?Stage two                           



                          ? ? ? ? ?| ? Decreased                           



                          GFR ? ? ? ?                            



                          +---------------------                           



                          --+-------------------                           



                          --+-------------------                           



                          ------+| ?30-59 ? ? ?                           



                          ? ? ? ? ?| ?Stage                           



                          three ? ? ? ?| ? Stage                           



                          three ? ? ? ? ?                           



                          +---------------------                           



                          --+-------------------                           



                          --+-------------------                           



                          ------+| ?15-29 ? ? ?                           



                          ? ? ? ? ?| ?Stage four                           



                          ? ? ? ? | ? Stage four                           



                          ? ? ? ? ?                              



                          ?+--------------------                           



                          ---+------------------                           



                          ---+------------------                           



                          -------+| ?<15 (or                           



                          dialysis) ? ?| ?Stage                           



                          five ? ? ? ? | ? Stage                           



                          five ? ? ? ? ?                           



                          ?+--------------------                           



                          ---+------------------                           



                          ---+------------------                           



                          -------+ *Each stage                           



                          assumes the associated                           



                          GFR level has been in                           



                          effect for at least                           



                          three months. ?Stages                           



                          1 to 5, with or                           



                          without kidney                           



                          disease, indicate                           



                          chronic kidney                           



                          disease. Notes:                           



                          Determination of                           



                          stages one and two                           



                          (with eGFR                             



                          >59mL/min/1.73 m2)                           



                          requires estimation of                           



                          kidney damage for at                           



                          least three months as                           



                          defined by structural                           



                          or functional                           



                          abnormalities of the                           



                          kidney, manifested by                           



                          either:Pathological                           



                          abnormalities or                           



                          Markers of kidney                           



                          damage (including                           



                          abnormalities in the                           



                          composition of the                           



                          blood or urine or                           



                          abnormalities in                           



                          imaging tests).                           

 

             Lab Interpretation Abnormal                               



             (test code = 78705-0)                                        



[facility]





             Test Item    Value        Reference Range Interpretation Comments

 

             MAGNESIUM (test code = 3927384162) 2.1 mg/dL    1.7-2.4            

       

 

             Lab Interpretation (test code = Normal                             

    



             13913-6)                                            



[facility]





             Test Item    Value        Reference Range Interpretation Comments

 

             PHOSPHORUS (test code = 1146849248) 3.5 mg/dL    2.5-5.0           

        

 

             Lab Interpretation (test code = Normal                             

    



             53436-2)                                            



[id number] 11[facility]23:12





             Test Item    Value        Reference Range Interpretation Comments

 

             CK (test code = 4776221447) 331 U/L             H            

 

             Lab Interpretation (test code = Abnormal                           

    



             06729-4)                                            



[id number] 11:01:31





             Test Item    Value        Reference Range Interpretation Comments

 

             LDH (test code = 6615231086) 232 U/L      120-246                  

 

 

             Lab Interpretation (test code = Normal                             

    



             87116-3)                                            



[facility] [id number] 10:22:24





             Test Item    Value        Reference Range Interpretation Comments

 

             ESR (test code = 33           See_Comment  H             [Automated

 message]



             21265-1)                                            The system [employer]



                                                                 generated this 

result



                                                                 transmitted ref

erence



                                                                 range: 0 - 10 m

m/HR.



                                                                 The reference r

patrizia



                                                                 was not used to



                                                                 interpret this 

result



                                                                 as normal/abnor

mal.

 

             Lab Interpretation (test Abnormal                               



             code = 69384-0)                                        



[facility] 
10:03:18





             Test Item    Value        Reference Range Interpretation Comments

 

             HGB A1C (test code = 5.7 %        4.0-5.7                   



             4548-4)                                             

 

             COOKIE (test code = COOKIE) Reference                              



                          RangesNormal:                           



                          <5.7%Prediabetes:                           



                          5.7 - 6.4%Diabetes:                           



                          > 6.5%                                 

 

             Lab Interpretation (test Normal                                 



             code = 83042-1)                                        



[facility] [id number] 09:11:56





             Test Item    Value        Reference Range Interpretation Comments

 

             WBC (test code = 7.81         See_Comment                [Automated



             6690-2)                                             message] The sy

stem



                                                                 which generated



                                                                 this result



                                                                 transmitted



                                                                 reference range

:



                                                                 4.20 - 10.70



                                                                 10*3/?L. The



                                                                 reference range

 was



                                                                 not used to



                                                                 interpret this



                                                                 result as



                                                                 normal/abnormal

.

 

             RBC (test code = 4.70         See_Comment                [Automated



             789-8)                                              message] The sy

stem



                                                                 which generated



                                                                 this result



                                                                 transmitted



                                                                 reference range

:



                                                                 4.26 - 5.52



                                                                 10*6/?L. The



                                                                 reference range

 was



                                                                 not used to



                                                                 interpret this



                                                                 result as



                                                                 normal/abnormal

.

 

             HGB (test code = 14.1 g/dL    12.2-16.4                 



             718-7)                                              

 

             HCT (test code = 42.0 %       38.4-49.3                 



             4544-3)                                             

 

             MCV (test code = 89.4 fL      81.7-95.6                 



             787-2)                                              

 

             MCH (test code = 30.0 pg      26.1-32.7                 



             785-6)                                              

 

             MCHC (test code = 33.6 g/dL    31.2-35.0                 



             786-4)                                              

 

             RDW-SD (test code = 40.8 fL      38.5-51.6                 



             33408-4)                                            

 

             RDW-CV (test code = 12.4 %       12.1-15.4                 



             788-0)                                              

 

             PLT (test code = 176          See_Comment                [Automated



             777-3)                                              message] The sy

stem



                                                                 which generated



                                                                 this result



                                                                 transmitted



                                                                 reference range

:



                                                                 150 - 328 10*3/

?L.



                                                                 The reference r

[employer]



                                                                 was not used to



                                                                 interpret this



                                                                 result as



                                                                 normal/abnormal

.

 

             MPV (test code = 11.5 fL      9.8-13.0                  



             64955-9)                                            

 

             NRBC/100 WBC (test 0.0          See_Comment                [Automat

ed



             code = 2483696883)                                        message] 

The system



                                                                 which generated



                                                                 this result



                                                                 transmitted



                                                                 reference range

:



                                                                 0.0 - 10.0 /100



                                                                 WBCs. The refer

ence



                                                                 range was not u

sed



                                                                 to interpret th

is



                                                                 result as



                                                                 normal/abnormal

.

 

             NRBC x10^3 (test code              See_Comment                [Auto

mated



             = [id number])                                        message] The s

ystem



                                                                 which generated



                                                                 this result



                                                                 transmitted



                                                                 reference range

:



                                                                 10*3/?L. The



                                                                 reference range

 was



                                                                 not used to



                                                                 interpret this



                                                                 result as



                                                                 normal/abnormal

.

 

             GRAN MAT (NEUT) % 83.9 %                                 



             (test code = 770-8)                                        

 

             IMM GRAN % (test code 0.60 %                                 



             = 9150839947)                                        

 

             LYMPH % (test code = 10.2 %                                 



             736-9)                                              

 

             MONO % (test code = 5.0 %                                  



             5905-5)                                             

 

             EOS % (test code = 0.0 %                                  



             713-8)                                              

 

             BASO % (test code = 0.3 %                                  



             706-2)                                              

 

             GRAN MAT x10^3(ANC) 6.55 10*3/uL 1.99-6.95                 



             (test code =                                        



             8573913106)                                         

 

             IMM GRAN x10^3 (test 0.05 10*3/uL 0.00-0.06                 



             code = 0080403058)                                        

 

             LYMPH x10^3 (test code 0.80 10*3/uL 1.09-3.23    L            



             = 731-0)                                            

 

             MONO x10^3 (test code 0.39 10*3/uL 0.36-1.02                 



             = 742-7)                                            

 

             EOS x10^3 (test code =              0.06-0.53    L            



             711-2)                                              

 

             BASO x10^3 (test code              0.01-0.09                 



             = 704-7)                                            

 

             Lab Interpretation Abnormal                               



             (test code = 95101-7)                                        



[facility]

## 2023-02-06 NOTE — RAD REPORT
EXAM DESCRIPTION:  [place] Single View2/6/2023 4:08 pm

 

CLINICAL HISTORY:  Chest pain

 

COMPARISON:  none

 

FINDINGS:  Mild to moderate bilateral patchy lung opacities

 

Heart is normal size

 

IMPRESSION:  Mild to moderate bilateral patchy lung opacities probably pneumonia

## 2023-02-06 NOTE — ER
Nurse's Notes                                                                                     

 [facility]                                                                 

Name: Dean Reyez Jr                                                                             

Age: 37 yrs                                                                                       

Sex: Male                                                                                         

: 1985                                                                                   

MRN: [id number]                                                                                   

Arrival Date: 2023                                                                          

Time: 15:41                                                                                       

Account#: [id number]                                                                            

Bed 15                                                                                            

Private MD:                                                                                       

Diagnosis: Muscle spasm;Pneumonia, unspecified organism                                           

                                                                                                  

Presentation:                                                                                     

                                                                                             

16:31 Chief complaint: Patient states: D/C Saturday from [facility], was admitted for covid         ph  

      pneumonia, c/o pain to upper abdomen w/ cough, pt anxious and tachypneic in triage,         

      states that he is having trouble breathing, Spo2 98% RA. Coronavirus screen: Vaccine        

      status: Patient reports being unvaccinated. Ebola Screen: No symptoms or risks              

      identified at this time. Initial Sepsis Screen: Does the patient meet any 2 criteria?       

      No. Patient's initial sepsis screen is negative. Does the patient have a suspected          

      source of infection? No. Patient's initial sepsis screen is negative. Risk Assessment:      

      Do you want to hurt yourself or someone else? Patient reports no desire to harm self or     

      others. Onset of symptoms was 2023.                                            

16:31 Method Of Arrival: EMS: [facility]  

16:31 Acuity: AZEB 3                                                                           ph  

                                                                                                  

Triage Assessment:                                                                                

18:35 General: Appears in no apparent distress. Behavior is calm, cooperative, appropriate    5 

      for age. Pain: Complains of pain in abdomen. GI: Reports upper abdominal pain.              

                                                                                                  

Historical:                                                                                       

- Allergies:                                                                                      

18:36 No Known Allergies;                                                                     jh5 

                                                                                                  

- Immunization history:: Adult Immunizations up to date.                                          

- Social history:: Smoking status: Patient denies any tobacco usage or history of.                

                                                                                                  

                                                                                                  

Screenin:35 [facility] ED Fall Risk Assessment (Adult) History of falling in the last 3 months,       5 

      including since admission No falls in past 3 months (0 pts) Confusion or Disorientation     

      No (0 pts) Intoxicated or Sedated No (0 pts) Impaired Gait No (0 pts) Mobility Assist       

      Device Used No (0 pt) Altered Elimination No (0 pt) Score/Fall Risk Level 0 - 2 = Low       

      Risk. Abuse screen: Denies threats or abuse. Denies injuries from another. Nutritional      

      screening: No deficits noted. Tuberculosis screening: No symptoms or risk factors           

      identified.                                                                                 

                                                                                                  

Assessment:                                                                                       

18:36 GI: Bowel sounds present X 4 quads. Abd is soft and non tender.                         jh5 

                                                                                                  

Vital Signs:                                                                                      

16:31  / 90; Pulse 78; Resp 26; Temp 97.8; Pulse Ox 98% on R/A; Weight 129.27 kg;       ph  

      Height 5 ft. 11 in. (180.34 cm);                                                            

16:31 Body Mass Index 39.75 (129.27 kg, 180.34 cm)                                            ph  

                                                                                                  

ED Course:                                                                                        

15:41 Patient arrived in ED.                                                                  rg4 

15:45 [facility]                                                        kb  

15:45 Edi Gibbs MD is Attending Physician.                                                kb  

16:09 Chest Single View XRAY In Process Unspecified.                                          EDMS

16:33 Triage completed.                                                                       ph  

16:33 Arm band placed on Patient placed in waiting room, Patient notified of wait time.         

17:18 Inserted saline lock: 18 gauge in right antecubital area, using aseptic technique.      5 

18:35 Patient has correct armband on for positive identification. Bed in low position. Call   [facility] 

      light in reach. Side rails up X 1.                                                          

18:35 No provider procedures requiring assistance completed.                                  5 

18:52 Patient did not have IV access during this emergency room visit.                        5 

                                                                                                  

Administered Medications:                                                                         

17:31 Drug: NS 0.9% 1000 ml Route: IV; Rate: 1 bolus; Site: right antecubital;                5 

17:31 Drug: TORadol - (ketorolac) 15 mg Route: IVP; Site: right antecubital;                  jh5 

17:31 Drug: Zofran (Ondansetron) 4 mg Route: IVP; Site: right antecubital;                    [facility] 

                                                                                                  

                                                                                                  

Medication:                                                                                       

18:35 VIS not applicable for this client.                                                     [facility] 

                                                                                                  

Outcome:                                                                                          

18:38 Discharge ordered by MD.                                                                kb  

18:52 Discharged to home ambulatory.                                                          5 

18:52 Condition: good                                                                             

18:52 Discharge instructions given to patient, Instructed on discharge instructions, follow       

      up and referral plans. medication usage, safety practices, Demonstrated understanding       

      of instructions, follow-up care, medications.                                               

18:58 Patient left the ED.                                                                    [facility] 

                                                                                                  

Signatures:                                                                                       

Dispatcher [employer]                           EDMS                                                 

Janine Valadez FNP-C FNP-Ckb Hall, Patricia, RN RN ph Garcia, Rubi                                 rg4                                                  

Mary Hill RN RN   [facility]                                                  

                                                                                                  

**************************************************************************************************